# Patient Record
Sex: MALE | Race: WHITE | URBAN - METROPOLITAN AREA
[De-identification: names, ages, dates, MRNs, and addresses within clinical notes are randomized per-mention and may not be internally consistent; named-entity substitution may affect disease eponyms.]

---

## 2023-10-12 ENCOUNTER — TELEPHONE (OUTPATIENT)
Dept: PHYSICAL THERAPY | Facility: CLINIC | Age: 58
End: 2023-10-12

## 2023-10-12 NOTE — TELEPHONE ENCOUNTER
Patient left without being seen due to insurance. Was rescheduled for next week.
[de-identified] : 62 year old RHD male presents complaining of low back pain that flared in July. He notes that he has had issues with his lower back in the past. He has had an MRI in the past, as well. He was told that he has a herniated disc and stenosis. He attributes his back pain to stress at work. He had pain radiating down his right leg initially but denies numbness and tingling. He had pain when sitting for long periods of time and when walking or standing for prolonged amounts of time. He does not have pain when sleeping at night. He has been going to PT for his neck and his therapist mentioned that some of his leg stretches may have caused some of his pain. He has stopped these stretches and his pain has improved a bit. His neck symptoms and right arm numbness have improved with the neck PT.